# Patient Record
Sex: FEMALE | Race: WHITE | Employment: FULL TIME | URBAN - NONMETROPOLITAN AREA
[De-identification: names, ages, dates, MRNs, and addresses within clinical notes are randomized per-mention and may not be internally consistent; named-entity substitution may affect disease eponyms.]

---

## 2020-03-16 ENCOUNTER — HOSPITAL ENCOUNTER (OUTPATIENT)
Dept: GENERAL RADIOLOGY | Age: 50
Discharge: HOME | End: 2020-03-16
Payer: MEDICAID

## 2020-03-16 ENCOUNTER — HOSPITAL ENCOUNTER (OUTPATIENT)
Age: 50
Discharge: HOME | End: 2020-03-16
Payer: MEDICAID

## 2020-03-16 PROCEDURE — 73502 X-RAY EXAM HIP UNI 2-3 VIEWS: CPT

## 2020-03-16 PROCEDURE — 72100 X-RAY EXAM L-S SPINE 2/3 VWS: CPT

## 2020-03-16 PROCEDURE — 72040 X-RAY EXAM NECK SPINE 2-3 VW: CPT

## 2020-09-30 ENCOUNTER — HOSPITAL ENCOUNTER (EMERGENCY)
Age: 50
Discharge: HOME | End: 2020-09-30
Attending: EMERGENCY MEDICINE
Payer: MEDICAID

## 2020-09-30 ENCOUNTER — APPOINTMENT (OUTPATIENT)
Dept: GENERAL RADIOLOGY | Age: 50
End: 2020-09-30
Payer: MEDICAID

## 2020-09-30 VITALS
DIASTOLIC BLOOD PRESSURE: 97 MMHG | OXYGEN SATURATION: 97 % | HEART RATE: 87 BPM | HEIGHT: 63 IN | BODY MASS INDEX: 28.35 KG/M2 | SYSTOLIC BLOOD PRESSURE: 150 MMHG | WEIGHT: 160 LBS | TEMPERATURE: 98.1 F | RESPIRATION RATE: 16 BRPM

## 2020-09-30 PROCEDURE — 99283 EMERGENCY DEPT VISIT LOW MDM: CPT

## 2020-09-30 PROCEDURE — 73630 X-RAY EXAM OF FOOT: CPT

## 2020-09-30 RX ORDER — NAPROXEN 500 MG/1
500 TABLET ORAL 2 TIMES DAILY PRN
Qty: 60 TABLET | Refills: 0 | Status: SHIPPED | OUTPATIENT
Start: 2020-09-30

## 2020-09-30 RX ORDER — GABAPENTIN 800 MG/1
800 TABLET ORAL 3 TIMES DAILY
COMMUNITY
Start: 2018-11-01

## 2020-09-30 RX ORDER — CITALOPRAM 40 MG/1
40 TABLET ORAL DAILY
COMMUNITY

## 2020-09-30 RX ORDER — BUSPIRONE HYDROCHLORIDE 5 MG/1
5 TABLET ORAL 2 TIMES DAILY
COMMUNITY

## 2020-09-30 ASSESSMENT — PAIN SCALES - GENERAL: PAINLEVEL_OUTOF10: 8

## 2020-09-30 ASSESSMENT — PAIN DESCRIPTION - DESCRIPTORS: DESCRIPTORS: BURNING;THROBBING

## 2020-09-30 ASSESSMENT — PAIN DESCRIPTION - LOCATION: LOCATION: FOOT

## 2020-09-30 ASSESSMENT — PAIN DESCRIPTION - ORIENTATION: ORIENTATION: RIGHT;INNER

## 2020-09-30 ASSESSMENT — PAIN DESCRIPTION - PAIN TYPE: TYPE: ACUTE PAIN

## 2020-09-30 NOTE — ED TRIAGE NOTES
Arrived ambulatory to room 5 for triage. Tolerated without difficulty. Bed in lowest position. Call light given.  Shoes and socks removed for exam.

## 2020-09-30 NOTE — ED NOTES
Post op shoe applied to the right foot, PMS intact. Discussed RICE with patient. (Rest, Ice, Compression, and Elevation) REST affected area. ICE to painful area for 20 minutes as often as every hour. COMPRESSION with an ace wrap for comfort and support. Advised to check circulation frequent to assure wrap is not to tight. ELEVATION of the affected area, when not in use, to help decrease swelling. Reviewed use of crutches and crutch walking with patient. Discussed the need to keep to keep the weight on the hands and not on the axilla-to avoid the compression of the axillary nerve. Discussed the following walking, sitting, and standing. Discharge instructions reviewed with patient. Reviewed prescriptions with patient. No additional questions asked. Voiced understanding. Encouraged patient to follow up as discussed by the ED physician.      Geena Jones RN  09/30/20 6825

## 2020-09-30 NOTE — ED PROVIDER NOTES
Financial resource strain: None    Food insecurity     Worry: None     Inability: None    Transportation needs     Medical: None     Non-medical: None   Tobacco Use    Smoking status: Current Some Day Smoker     Types: Cigarettes    Smokeless tobacco: Never Used    Tobacco comment: \"few times a week\"   Substance and Sexual Activity    Alcohol use: Not Currently    Drug use: Never    Sexual activity: None   Lifestyle    Physical activity     Days per week: None     Minutes per session: None    Stress: None   Relationships    Social connections     Talks on phone: None     Gets together: None     Attends Anabaptist service: None     Active member of club or organization: None     Attends meetings of clubs or organizations: None     Relationship status: None    Intimate partner violence     Fear of current or ex partner: None     Emotionally abused: None     Physically abused: None     Forced sexual activity: None   Other Topics Concern    None   Social History Narrative    None     History reviewed. No pertinent family history. PHYSICAL EXAM    VITAL SIGNS: BP (!) 150/97   Pulse 87   Temp 98.1 °F (36.7 °C) (Oral)   Resp 16   Ht 5' 3\" (1.6 m)   Wt 160 lb (72.6 kg)   SpO2 97%   BMI 28.34 kg/m²   Constitutional:  Well developed, appears comfortable  HENT:  Atraumatic  Respiratory:  No retractions  Musculoskeletal:   Right Lower Extemity:  The Right foot demonstrates tenderness to palpation of the plantar aspect of the foot on the medial aspect. Slight tenderness over the dorsum of the foot over the medial aspect, mainly over the first metatarsal.  No significant redness, tenderness, swelling of the first MTP joint or the great toe. Does seem to worsen with extension of the great toe. No palpable defects. Range of motion difficult to assess due to pain - no obvious deficits. The ankle joint is stable.    No swelling, discoloration, or tenderness to palpation of the proximal to distal lower leg bones, ankle & toes  Distal capillary refill, sensation, motor intact. Vascular:  DP pulse 2+, capillary refill intact. Integument:  No open wounds of the left ankle   Neurologic:  Awake alert, no slurred speech     RADIOLOGY   Right foot xrays:  Xr Foot Right (min 3 Views)    Result Date: 9/30/2020  EXAMINATION: THREE XRAY VIEWS OF THE RIGHT FOOT 9/30/2020 5:07 pm COMPARISON: None. HISTORY: ORDERING SYSTEM PROVIDED HISTORY: foot pain TECHNOLOGIST PROVIDED HISTORY: Right Foot Reason for exam:->foot pain Reason for Exam: foot pain, swelling Acuity: Acute Type of Exam: Initial Additional signs and symptoms: NKI or trauma FINDINGS: There is no evidence of acute fracture. There is normal alignment of the tarsometatarsal joints. No acute joint abnormality. No focal osseous lesion. No focal soft tissue abnormality. No acute osseous abnormality. ED COURSE & MEDICAL DECISION MAKING       Vital signs and nursing notes reviewed during ED course. I have independently evaluated this patient . All pertinent Lab data and radiographic results reviewed with patient at bedside. The patient and/or the family were informed of the results of any tests/labs/imaging, the treatment plan, and time was allotted to answer questions. 50-year-old female presenting with right foot pain.'s been ongoing for weeks to months, worse in the last several days. X-ray performed which shows no acute osseous abnormality. Given the location of the pain may be plantar fasciitis versus a tendinopathy of the foot. Did recommend symptomatic care including NSAIDs, ice, elevation and flat bottom shoe. Instructions given to follow-up with podiatry on an outpatient basis. Differential diagnosis includes but not limited to fracture, dislocation, vascular injury, neurologic injury. The likelihood of other entities in the differential is insufficient to justify any further testing for them. This was explained to the patient. The patient was advised that persistent or worsening symptoms would require further evaluation. Clinical  IMPRESSION    Foot pain      Diagnosis and plan discussed in detail with patient who understands and agrees. Patient agrees to return emergency department if symptoms worsen or any new symptoms develop.     (Please note the MDM and HPI sections of this note were completed with a voice recognition program.  Efforts were made to edit the dictations but occasionally words are mis-transcribed.)      Jt Barrera,   09/30/20 4250

## 2021-01-05 ENCOUNTER — HOSPITAL ENCOUNTER (OUTPATIENT)
Dept: MRI IMAGING | Age: 51
Discharge: HOME | End: 2021-01-05
Payer: MEDICAID

## 2021-01-05 DIAGNOSIS — M54.16 LUMBAR RADICULOPATHY: ICD-10-CM

## 2021-01-05 DIAGNOSIS — M54.40 LOW BACK PAIN WITH SCIATICA, SCIATICA LATERALITY UNSPECIFIED, UNSPECIFIED BACK PAIN LATERALITY, UNSPECIFIED CHRONICITY: ICD-10-CM

## 2021-01-05 PROCEDURE — 72148 MRI LUMBAR SPINE W/O DYE: CPT

## 2024-05-12 ENCOUNTER — HOSPITAL ENCOUNTER (EMERGENCY)
Age: 54
Discharge: HOME | End: 2024-05-12
Attending: EMERGENCY MEDICINE
Payer: MEDICAID

## 2024-05-12 ENCOUNTER — APPOINTMENT (OUTPATIENT)
Dept: GENERAL RADIOLOGY | Age: 54
End: 2024-05-12
Payer: MEDICAID

## 2024-05-12 VITALS
TEMPERATURE: 97.8 F | HEIGHT: 63 IN | BODY MASS INDEX: 31.01 KG/M2 | SYSTOLIC BLOOD PRESSURE: 131 MMHG | HEART RATE: 69 BPM | WEIGHT: 175 LBS | DIASTOLIC BLOOD PRESSURE: 93 MMHG | OXYGEN SATURATION: 97 % | RESPIRATION RATE: 18 BRPM

## 2024-05-12 DIAGNOSIS — M79.671 RIGHT FOOT PAIN: Primary | ICD-10-CM

## 2024-05-12 PROCEDURE — 99284 EMERGENCY DEPT VISIT MOD MDM: CPT

## 2024-05-12 PROCEDURE — 6360000002 HC RX W HCPCS: Performed by: EMERGENCY MEDICINE

## 2024-05-12 PROCEDURE — 96372 THER/PROPH/DIAG INJ SC/IM: CPT

## 2024-05-12 PROCEDURE — 73630 X-RAY EXAM OF FOOT: CPT

## 2024-05-12 RX ORDER — METHYLPREDNISOLONE 4 MG/1
TABLET ORAL
Qty: 1 KIT | Refills: 0 | Status: SHIPPED | OUTPATIENT
Start: 2024-05-12

## 2024-05-12 RX ORDER — KETOROLAC TROMETHAMINE 30 MG/ML
30 INJECTION, SOLUTION INTRAMUSCULAR; INTRAVENOUS ONCE
Status: COMPLETED | OUTPATIENT
Start: 2024-05-12 | End: 2024-05-12

## 2024-05-12 RX ORDER — PANTOPRAZOLE SODIUM 40 MG/1
40 TABLET, DELAYED RELEASE ORAL 2 TIMES DAILY
COMMUNITY
Start: 2024-02-10

## 2024-05-12 RX ORDER — TRAMADOL HYDROCHLORIDE 50 MG/1
50 TABLET ORAL EVERY 6 HOURS PRN
Qty: 10 TABLET | Refills: 0 | Status: SHIPPED | OUTPATIENT
Start: 2024-05-12 | End: 2024-05-15

## 2024-05-12 RX ADMIN — KETOROLAC TROMETHAMINE 30 MG: 30 INJECTION, SOLUTION INTRAMUSCULAR at 22:12

## 2024-05-12 ASSESSMENT — LIFESTYLE VARIABLES
HOW MANY STANDARD DRINKS CONTAINING ALCOHOL DO YOU HAVE ON A TYPICAL DAY: PATIENT DOES NOT DRINK
HOW OFTEN DO YOU HAVE A DRINK CONTAINING ALCOHOL: NEVER

## 2024-05-12 ASSESSMENT — PAIN SCALES - GENERAL
PAINLEVEL_OUTOF10: 5
PAINLEVEL_OUTOF10: 5
PAINLEVEL_OUTOF10: 3

## 2024-05-12 ASSESSMENT — PAIN DESCRIPTION - ORIENTATION
ORIENTATION: RIGHT
ORIENTATION: RIGHT

## 2024-05-12 ASSESSMENT — PAIN DESCRIPTION - LOCATION
LOCATION: FOOT
LOCATION: ANKLE

## 2024-05-12 ASSESSMENT — PAIN DESCRIPTION - DESCRIPTORS
DESCRIPTORS: ACHING;SORE
DESCRIPTORS: ACHING

## 2024-05-12 ASSESSMENT — PAIN DESCRIPTION - PAIN TYPE: TYPE: ACUTE PAIN

## 2024-05-13 NOTE — DISCHARGE INSTR - COC
Continuity of Care Form    Patient Name: Annelise Livingston   :  1970  MRN:  [id number]    Admit date:  2024  Discharge date:  ***    Code Status Order: No Order   Advance Directives:     Admitting Physician:  No admitting provider for patient encounter.  PCP: Dia Stewart DO    Discharging Nurse: ***  Discharging Hospital Unit/Room#:   Discharging Unit Phone Number: ***    Emergency Contact:   Extended Emergency Contact Information  Primary Emergency Contact: NAYAN TORRES  Home Phone: [phone]  Relation: Parent    Past Surgical History:  Past Surgical History:   Procedure Laterality Date    HYSTERECTOMY (CERVIX STATUS UNKNOWN)      THYROID SURGERY         Immunization History:     There is no immunization history on file for this patient.    Active Problems:  There is no problem list on file for this patient.      Isolation/Infection:   Isolation            No Isolation          Patient Infection Status       None to display            Nurse Assessment:  Last Vital Signs: BP (!) 131/93   Pulse 69   Temp 97.8 °F (36.6 °C) (Oral)   Resp 18   Ht 1.6 m (5' 3\")   Wt 79.4 kg (175 lb)   SpO2 97%   BMI 31.00 kg/m²     Last documented pain score (0-10 scale): Pain Level: 5  Last Weight:   Wt Readings from Last 1 Encounters:   24 79.4 kg (175 lb)     Mental Status:  {IP PT MENTAL STATUS:}    IV Access:  { GALILEO IV ACCESS:543234253}    Nursing Mobility/ADLs:  Walking   {CHP DME ADLs:788728402}  Transfer  {CHP DME ADLs:998867102}  Bathing  {CHP DME ADLs:524208204}  Dressing  {CHP DME ADLs:071320223}  Toileting  {CHP DME ADLs:680897220}  Feeding  {CHP DME ADLs:139918233}  Med Admin  {CHP DME ADLs:624963794}  Med Delivery   { GALILEO MED Delivery:620072215}    Wound Care Documentation and Therapy:        Elimination:  Continence:   Bowel: {YES / NO:}  Bladder: {YES / NO:}  Urinary Catheter: {Urinary Catheter:839676609}   Colostomy/Ileostomy/Ileal Conduit: {YES / NO:}

## 2024-05-13 NOTE — ED PROVIDER NOTES
None    Disposition Considerations (tests considered but not done, Shared Decision Making, Pt Expectation of Test or Tx.): Patient will be discharged stable condition to follow-up with a local podiatrist.  She will be given prescription for Medrol Dosepak and tramadol.  She is instructed return for any worsening signs and symptoms.  Appropriate for outpatient management      I am the Primary Clinician of Record.        Final Impression:  1. Right foot pain      DISPOSITION Decision To Discharge    Patient referred to:  Varun Hyatt DPM  [address]  [place] [id number]  [phone]    Schedule an appointment as soon as possible for a visit in 1 week  For follow up    Discharge medications:  New Prescriptions    METHYLPREDNISOLONE (MEDROL, JOSÉ MANUEL,) 4 MG TABLET    Take by mouth.    TRAMADOL (ULTRAM) 50 MG TABLET    Take 1 tablet by mouth every 6 hours as needed for Pain for up to 3 days. Max Daily Amount: 200 mg     (Please note that portions of this note may have been completed with a voice recognition program. Efforts were made to edit the dictations but occasionally words are mis-transcribed.)    Amador Livingston DO, FACEP  Board certified in Emergency Medicine   Acute Care [facility]        Amador Livingston DO  05/12/24 7807

## 2024-05-13 NOTE — DISCHARGE INSTRUCTIONS
Use medications as directed.  Follow-up with podiatrist soon as possible for reevaluation and treatment.  Return to ER for worsening signs and symptoms.